# Patient Record
Sex: MALE | Race: WHITE | NOT HISPANIC OR LATINO | URBAN - METROPOLITAN AREA
[De-identification: names, ages, dates, MRNs, and addresses within clinical notes are randomized per-mention and may not be internally consistent; named-entity substitution may affect disease eponyms.]

---

## 2024-09-13 ENCOUNTER — OFFICE VISIT (OUTPATIENT)
Dept: URGENT CARE | Age: 34
End: 2024-09-13
Payer: COMMERCIAL

## 2024-09-13 VITALS
BODY MASS INDEX: 21.48 KG/M2 | OXYGEN SATURATION: 96 % | TEMPERATURE: 98.8 F | HEIGHT: 69 IN | HEART RATE: 90 BPM | WEIGHT: 145 LBS | RESPIRATION RATE: 18 BRPM | DIASTOLIC BLOOD PRESSURE: 77 MMHG | SYSTOLIC BLOOD PRESSURE: 121 MMHG

## 2024-09-13 DIAGNOSIS — H66.001 ACUTE SUPPURATIVE OTITIS MEDIA OF RIGHT EAR WITHOUT SPONTANEOUS RUPTURE OF TYMPANIC MEMBRANE, RECURRENCE NOT SPECIFIED: Primary | ICD-10-CM

## 2024-09-13 DIAGNOSIS — U07.1 COVID: ICD-10-CM

## 2024-09-13 RX ORDER — LEVOFLOXACIN 750 MG/1
750 TABLET ORAL DAILY
Qty: 5 TABLET | Refills: 0 | Status: SHIPPED | OUTPATIENT
Start: 2024-09-13 | End: 2024-09-18

## 2024-09-13 RX ORDER — CETIRIZINE HYDROCHLORIDE 10 MG/1
TABLET ORAL
COMMUNITY

## 2024-09-13 NOTE — PATIENT INSTRUCTIONS
Go to the emergency department for severe symptoms.    Follow-up with primary care to recheck the ear in 1 month.    Multivitamins once a day, take at least 2 hours from the antibiotic.    Acetaminophen 500 mg (Extra StrengthTylenol), 2 tablets every 6 hours as needed for fever or pain.      Ibuprofen 200 mg (Advil or Motrin), 3 tablets with food every 6 hours as needed for fever or pain.        Wear a mask when out and about for 10 days from symptom onset of COVID-19.

## 2024-09-13 NOTE — PROGRESS NOTES
"Subjective   Patient ID: Reymundo Yee is a 34 y.o. male. They present today with a chief complaint of Earache (Right ear. Had an ear infection about a week ago and caught covid. Thinks ear is clogged. ).    History of Present Illness  Patient currently has COVID-19, day 3.  He is treating it symptomatically.  He is here because of possible recurrence of his right ear infection.  Patient states last week he was diagnosed with right ear infection, treated with a Z-Yariel.  He completed the course but the pain never completely went away.  Yesterday it got acutely worse and he feels like his right ear is blocked.  He has allergy to amoxicillin and Ceclor causing a rash.  He has been taking Tylenol for his symptoms.      Past Medical History  Allergies as of 09/13/2024 - Reviewed 09/13/2024   Allergen Reaction Noted    Amoxicillin Unknown 09/13/2024    Cefaclor Unknown 12/30/2009       (Not in a hospital admission)       No past medical history on file.    Past Surgical History:   Procedure Laterality Date    OTHER SURGICAL HISTORY  08/20/2016    Closed Treatment Of Fracture Of A DIP Finger Joint        reports that he has never smoked. He has never used smokeless tobacco. He reports current alcohol use. He reports that he does not use drugs.    Review of Systems  Review of Systems                               Objective    Vitals:    09/13/24 1558   BP: 121/77   BP Location: Left arm   Patient Position: Sitting   BP Cuff Size: Adult   Pulse: 90   Resp: 18   Temp: 37.1 °C (98.8 °F)   TempSrc: Oral   SpO2: 96%   Weight: 65.8 kg (145 lb)   Height: 1.753 m (5' 9\")     No LMP for male patient.    Physical Exam    Constitutional: Vital signs reviewed. Well developed and well nourished. Patient alert and without distress.     Head and Face: Normal, no orbital swelling.     Eyes: Pupils and irises normal. Pink conjunctivae, anicteric sclerae. No conjunctival injection.    Ears: Auricles normal.  The right TM is red, bulging and " with effusion.  TM and EAM on the left.    Neck: Supple.    Pulmonary: No respiratory distress.     Neurologic: Cortical function: Normal        Procedures    Point of Care Test & Imaging Results from this visit  No results found for this visit on 09/13/24.   No results found.    Diagnostic study results (if any) were reviewed by Roya Fiore.    Assessment/Plan   Allergies, medications, history, and pertinent labs/EKGs/Imaging reviewed by Roya Fiore.     Medical Decision Making      Orders and Diagnoses  Diagnoses and all orders for this visit:  Acute suppurative otitis media of right ear without spontaneous rupture of tympanic membrane, recurrence not specified  -     levoFLOXacin (Levaquin) 750 mg tablet; Take 1 tablet (750 mg) by mouth once daily for 5 days.  COVID      Medical Admin Record      Follow Up Instructions  No follow-ups on file.    Patient disposition: Home    Electronically signed by Roya Fiore  10:59 PM

## 2025-04-21 ENCOUNTER — HOSPITAL ENCOUNTER (EMERGENCY)
Facility: HOSPITAL | Age: 35
Discharge: HOME | End: 2025-04-21
Attending: STUDENT IN AN ORGANIZED HEALTH CARE EDUCATION/TRAINING PROGRAM
Payer: COMMERCIAL

## 2025-04-21 VITALS
OXYGEN SATURATION: 98 % | HEIGHT: 69 IN | WEIGHT: 150 LBS | DIASTOLIC BLOOD PRESSURE: 86 MMHG | TEMPERATURE: 97.4 F | RESPIRATION RATE: 18 BRPM | BODY MASS INDEX: 22.22 KG/M2 | SYSTOLIC BLOOD PRESSURE: 126 MMHG | HEART RATE: 59 BPM

## 2025-04-21 DIAGNOSIS — R09.A2 FOREIGN BODY SENSATION, THROAT: Primary | ICD-10-CM

## 2025-04-21 PROCEDURE — 99284 EMERGENCY DEPT VISIT MOD MDM: CPT | Performed by: STUDENT IN AN ORGANIZED HEALTH CARE EDUCATION/TRAINING PROGRAM

## 2025-04-21 PROCEDURE — 2500000004 HC RX 250 GENERAL PHARMACY W/ HCPCS (ALT 636 FOR OP/ED): Mod: JZ | Performed by: PHYSICIAN ASSISTANT

## 2025-04-21 PROCEDURE — 99285 EMERGENCY DEPT VISIT HI MDM: CPT | Performed by: PHYSICIAN ASSISTANT

## 2025-04-21 PROCEDURE — 96372 THER/PROPH/DIAG INJ SC/IM: CPT | Performed by: PHYSICIAN ASSISTANT

## 2025-04-21 PROCEDURE — 2500000005 HC RX 250 GENERAL PHARMACY W/O HCPCS: Performed by: PHYSICIAN ASSISTANT

## 2025-04-21 RX ORDER — LIDOCAINE HYDROCHLORIDE 20 MG/ML
1.25 SOLUTION OROPHARYNGEAL ONCE
Status: COMPLETED | OUTPATIENT
Start: 2025-04-21 | End: 2025-04-21

## 2025-04-21 RX ADMIN — GLUCAGON 1 MG: KIT at 22:24

## 2025-04-21 RX ADMIN — LIDOCAINE HYDROCHLORIDE 1.25 ML: 20 SOLUTION ORAL at 22:24

## 2025-04-21 ASSESSMENT — PAIN SCALES - GENERAL
PAINLEVEL_OUTOF10: 2
PAINLEVEL_OUTOF10: 2

## 2025-04-21 ASSESSMENT — COLUMBIA-SUICIDE SEVERITY RATING SCALE - C-SSRS
1. IN THE PAST MONTH, HAVE YOU WISHED YOU WERE DEAD OR WISHED YOU COULD GO TO SLEEP AND NOT WAKE UP?: NO
6. HAVE YOU EVER DONE ANYTHING, STARTED TO DO ANYTHING, OR PREPARED TO DO ANYTHING TO END YOUR LIFE?: NO
2. HAVE YOU ACTUALLY HAD ANY THOUGHTS OF KILLING YOURSELF?: NO

## 2025-04-21 ASSESSMENT — PAIN DESCRIPTION - LOCATION
LOCATION: THROAT
LOCATION: THROAT

## 2025-04-21 ASSESSMENT — PAIN DESCRIPTION - PAIN TYPE: TYPE: ACUTE PAIN

## 2025-04-21 ASSESSMENT — PAIN - FUNCTIONAL ASSESSMENT: PAIN_FUNCTIONAL_ASSESSMENT: 0-10

## 2025-04-22 NOTE — ED PROVIDER NOTES
HPI   Chief Complaint   Patient presents with    Pill stuck in throat       HPI  Is a 84-year-old male with a past medical history of giant papillary conjunctivitis, myopia of both eyes has the ED for evaluation of pill stuck in his throat.  Patient reports that at around 3:30 PM he took 2 Motrin tablets and believes they are stuck in his throat at the level of his Gunner's apple.  Reports he has tried eating and drinking after and the food is just piling up.  Does endorse increased salivation and has a cup next to him that he is spitting into.  This has happened to him before and usually resolves on its own.  Has not resolved today.  Presents to ED with his mother.  Denies family history of esophageal disease, he is not a smoker.  No history of GERD.  Has never followed up with GI for this.  Denies fever, chills, nausea, vomiting, shortness of breath, chest pain.      Patient History   Medical History[1]  Surgical History[2]  Family History[3]  Social History[4]    Physical Exam   ED Triage Vitals [04/21/25 2026]   Temperature Heart Rate Respirations BP   36.3 °C (97.4 °F) 59 18 126/86      Pulse Ox Temp Source Heart Rate Source Patient Position   98 % Tympanic -- --      BP Location FiO2 (%)     -- --       Physical Exam  Vitals reviewed.   Constitutional:       General: He is not in acute distress.     Appearance: Normal appearance. He is not ill-appearing or toxic-appearing.   HENT:      Head: Normocephalic and atraumatic.      Nose: No congestion or rhinorrhea.      Mouth/Throat:      Mouth: Mucous membranes are moist.      Pharynx: Oropharynx is clear. No oropharyngeal exudate or posterior oropharyngeal erythema.   Eyes:      General: No scleral icterus.        Right eye: No discharge.         Left eye: No discharge.      Conjunctiva/sclera: Conjunctivae normal.   Cardiovascular:      Rate and Rhythm: Normal rate and regular rhythm.      Pulses: Normal pulses.      Heart sounds: Normal heart sounds. No murmur  heard.  Pulmonary:      Effort: Pulmonary effort is normal. No respiratory distress.      Breath sounds: Normal breath sounds. No stridor. No wheezing or rhonchi.   Abdominal:      General: Abdomen is flat.   Musculoskeletal:      Cervical back: Neck supple.   Skin:     General: Skin is warm and dry.      Capillary Refill: Capillary refill takes less than 2 seconds.   Neurological:      General: No focal deficit present.      Mental Status: He is alert and oriented to person, place, and time.   Psychiatric:         Mood and Affect: Mood normal.         Behavior: Behavior normal.           ED Course & MDM   Diagnoses as of 04/21/25 2308   Foreign body sensation, throat                 No data recorded     Kena Coma Scale Score: 15 (04/21/25 2028 : Laure Wilhelm RN)                           Medical Decision Making  Is a 84-year-old male with a past medical history of giant papillary conjunctivitis, myopia of both eyes has the ED for evaluation of pill stuck in his throat.  On exam patient is well-appearing and in no acute distress.  Vital signs are stable.  Cardiopulmonary exam largely unremarkable.  Talking in complete sentences.  Does not appear uncomfortable.  ENT exam largely unremarkable.  Differential includes but is not limited to esophageal irritation, foreign body sensation, anxiety.  Lower suspicion for esophageal stricture, mass, achalasia but will consider if failed med trial.  Patient staffed with ED attending physician.    Treated in ED with viscous lidocaine and 1 mg glucagon IM.  On reevaluation patient was able to tolerate liquid p.o.  He still feels esophageal irritation but is happy that he can drink without feeling nauseous or vomiting.  He will be given a referral to GI to be evaluated for the symptoms.  No emergent need for EGD identified at this time.    Social determinants of health affecting care:  None     At this time, low suspicion for an acute process that would necessitate further  emergent workup, urgent specialist consultation, or hospitalization.  Based on clinical workup and discussion with attending physician, the disposition of discharge is appropriate.  Advised patient to pursue close follow-up with PCP.  Patient was provided with appropriate information to assist in obtaining the proper follow-up.  Discussed strict return to ED protocols with patient, including low threshold to return for changing/worsening symptoms.  Patient demonstrated understanding and agreement with the plan of care, and all questions answered prior to discharge.  Patient stable at time of discharge.     --------------------------------------------------------------------------------------------------------------------------------------------------------------------------------------------------------------------------    This note was dictated using a speech recognition program.  While an attempt was made at proof reading to minimize errors, minor errors in transcription may be present call for questions.     procedure  Procedures       iWl Benito PA-C  04/21/25 8081         [1] History reviewed. No pertinent past medical history.  [2]   Past Surgical History:  Procedure Laterality Date    OTHER SURGICAL HISTORY  08/20/2016    Closed Treatment Of Fracture Of A DIP Finger Joint   [3] No family history on file.  [4]   Social History  Tobacco Use    Smoking status: Never    Smokeless tobacco: Never   Vaping Use    Vaping status: Never Used   Substance Use Topics    Alcohol use: Yes     Comment: occasionally    Drug use: Never        Wil Benito PA-C  04/21/25 2802

## 2025-04-22 NOTE — ED TRIAGE NOTES
Patient states that he has a pill stuck in his throat and cannot swollen liquids and cannot maintain his own secretions. Airway in tach.

## 2025-04-22 NOTE — DISCHARGE INSTRUCTIONS
You were seen in the emergency department for evaluation of pills stuck in throat.  You were able to tolerate water without throwing up.  You were given a GI referral.    Please follow-up with your primary care provider.  If you do not have a primary care provider you can call [phone] to make an appointment.    Please do not hesitate to return to the ED if symptoms change or worsen.

## 2025-08-11 ENCOUNTER — APPOINTMENT (OUTPATIENT)
Dept: GASTROENTEROLOGY | Facility: CLINIC | Age: 35
End: 2025-08-11
Payer: COMMERCIAL